# Patient Record
Sex: MALE | Race: WHITE | NOT HISPANIC OR LATINO | URBAN - NONMETROPOLITAN AREA
[De-identification: names, ages, dates, MRNs, and addresses within clinical notes are randomized per-mention and may not be internally consistent; named-entity substitution may affect disease eponyms.]

---

## 2020-04-07 ENCOUNTER — OFFICE VISIT (OUTPATIENT)
Dept: MEDICAL GROUP | Facility: PHYSICIAN GROUP | Age: 70
End: 2020-04-07
Payer: MEDICARE

## 2020-04-07 VITALS
OXYGEN SATURATION: 98 % | HEIGHT: 66 IN | SYSTOLIC BLOOD PRESSURE: 130 MMHG | DIASTOLIC BLOOD PRESSURE: 78 MMHG | TEMPERATURE: 97.9 F | BODY MASS INDEX: 29.73 KG/M2 | RESPIRATION RATE: 16 BRPM | HEART RATE: 88 BPM | WEIGHT: 185 LBS

## 2020-04-07 DIAGNOSIS — Z11.59 ENCOUNTER FOR HEPATITIS C SCREENING TEST FOR LOW RISK PATIENT: ICD-10-CM

## 2020-04-07 DIAGNOSIS — E29.1 HYPOGONADISM IN MALE: ICD-10-CM

## 2020-04-07 DIAGNOSIS — Z12.11 SCREENING FOR COLON CANCER: ICD-10-CM

## 2020-04-07 DIAGNOSIS — E78.2 MIXED HYPERLIPIDEMIA: ICD-10-CM

## 2020-04-07 DIAGNOSIS — N52.8 OTHER MALE ERECTILE DYSFUNCTION: ICD-10-CM

## 2020-04-07 DIAGNOSIS — G47.33 OSA ON CPAP: ICD-10-CM

## 2020-04-07 PROCEDURE — 99204 OFFICE O/P NEW MOD 45 MIN: CPT | Performed by: PHYSICIAN ASSISTANT

## 2020-04-07 RX ORDER — TRIAMCINOLONE ACETONIDE 1 MG/G
OINTMENT TOPICAL PRN
COMMUNITY

## 2020-04-07 RX ORDER — TADALAFIL 20 MG/1
20 TABLET ORAL PRN
COMMUNITY
End: 2020-04-07 | Stop reason: SDUPTHER

## 2020-04-07 RX ORDER — TESTOSTERONE GEL, 1% 10 MG/G
50 GEL TRANSDERMAL DAILY
COMMUNITY
End: 2020-04-07 | Stop reason: SDUPTHER

## 2020-04-07 RX ORDER — TESTOSTERONE GEL, 1% 10 MG/G
50 GEL TRANSDERMAL DAILY
Qty: 1500 MG | Refills: 5 | Status: SHIPPED | OUTPATIENT
Start: 2020-04-07 | End: 2020-10-04

## 2020-04-07 RX ORDER — TADALAFIL 20 MG/1
20 TABLET ORAL
Qty: 30 TAB | Refills: 0 | Status: SHIPPED | OUTPATIENT
Start: 2020-04-07 | End: 2020-05-07

## 2020-04-07 SDOH — HEALTH STABILITY: MENTAL HEALTH: HOW OFTEN DO YOU HAVE A DRINK CONTAINING ALCOHOL?: 4 OR MORE TIMES A WEEK

## 2020-04-07 SDOH — HEALTH STABILITY: MENTAL HEALTH: HOW MANY STANDARD DRINKS CONTAINING ALCOHOL DO YOU HAVE ON A TYPICAL DAY?: 1 OR 2

## 2020-04-07 ASSESSMENT — PATIENT HEALTH QUESTIONNAIRE - PHQ9: CLINICAL INTERPRETATION OF PHQ2 SCORE: 0

## 2020-04-07 NOTE — PROGRESS NOTES
CC:    Chief Complaint   Patient presents with   • Establish Care   • Medication Refill       HISTORY OF THE PRESENT ILLNESS: Patient is a 69 y.o. male presenting to [facility] primary care. He recently moved to [place] from [facility].     1. Pt had physical exam done 10/2019 and had labs done.   2. Pt on Androgel for hypogonadism.   3. He has high cholesterol. He has a high RBC count and donates blood every 60 days. He was told it was 2/2 the testosterone use.   4. Pt using Kenalog ointment in his ears for itchy skin. Wears hearing aids that irritates it.   5. Pt has CHERYL and uses CPAP every night.       Allergies: Statins [hmg-coa-r inhibitors] and Onion    Current Outpatient Medications Ordered in Epic   Medication Sig Dispense Refill   • testosterone (TESTIM/ANDROGEL) 50 MG/5GM (1%) Gel gel Apply 50 mg to skin as directed every day.     • tadalafil (CIALIS) 20 MG tablet Take 20 mg by mouth as needed for Erectile Dysfunction.     • triamcinolone acetonide (KENALOG) 0.1 % Ointment Apply  to affected area(s) as needed.       No current Epic-ordered facility-administered medications on file.        Past Medical History:   Diagnosis Date   • H/O measles     as a child    • Hypogonadism male        Past Surgical History:   Procedure Laterality Date   • HEMORRHOIDECTOMY      20 years ago    • TONSILLECTOMY      as a child       Social History     Tobacco Use   • Smoking status: Never Smoker   • Smokeless tobacco: Never Used   Substance Use Topics   • Alcohol use: Yes     Frequency: 4 or more times a week     Drinks per session: 1 or 2     Comment: wine and beer    • Drug use: Never       Social History     Social History Narrative   • Not on file       Family History   Problem Relation Age of Onset   • Stroke Mother    • No Known Problems Sister        ROS:     - Constitutional: Negative for fever, chills, unexpected weight change, and fatigue/generalized weakness.     - HEENT: Negative for headaches, vision changes,  "hearing changes, ear pain, ear discharge, rhinorrhea, sinus congestion, sore throat, and neck pain.      - Respiratory: Negative for cough, sputum production, chest congestion, dyspnea, wheezing, and crackles.      - Cardiovascular: Negative for chest pain, palpitations, orthopnea, and bilateral lower extremity edema.     - Gastrointestinal: Negative for heartburn, nausea, vomiting, abdominal pain, hematochezia, melena, diarrhea, constipation, and greasy/foul-smelling stools.     - Genitourinary: Negative for dysuria, polyuria, hematuria, pyuria, urinary urgency, and urinary incontinence.     - Musculoskeletal:Positive for neck and joint pains. Negative for myalgias.      - Skin: Negative for rash, itching, cyanotic skin color change.     - Neurological: Negative for dizziness, tingling, tremors, focal sensory deficit, focal weakness and headaches.     - Endo/Heme/Allergies: Does not bruise/bleed easily.     - Psychiatric/Behavioral: Negative for depression, suicidal/homicidal ideation and memory loss.        - NOTE: All other systems reviewed and are negative, except as in HPI.          Exam: /78 (BP Location: Left arm, Patient Position: Sitting, BP Cuff Size: Adult)   Pulse 88   Temp 36.6 °C (97.9 °F) (Temporal)   Resp 16   Ht 1.683 m (5' 6.25\")   Wt 83.9 kg (185 lb)   SpO2 98%  Body mass index is 29.63 kg/m².    General: Normal appearing. No acute distress.  Skin: Warm and dry.  No obvious lesions.  HEENT: Normocephalic. Eyes conjunctiva clear lids without ptosis, ears normal shape and contour  Cardiovascular: Regular rate and rhythm without murmur.   Respiratory: Clear to auscultation bilaterally, no rhonchi wheezing or rales.  Neurologic: Grossly nonfocal, A&O x3, gait normal,  Musculoskeletal: No deformity or swelling.   Extremities: No extremity cyanosis, clubbing, or edema.  Psych: Normal mood and affect. Judgment and insight is normal.    Please note that this dictation was created using voice " recognition software. I have made every reasonable attempt to correct obvious errors, but I expect that there are errors of grammar and possibly content that I did not discover before finalizing the note.      Assessment/Plan    1. Hypogonadism in male  Refill of his medication was printed.  - TESTOSTERONE F&T MALE ADULT; Future  - testosterone (TESTIM/ANDROGEL) 50 MG/5GM (1%) Gel gel; Apply 50 mg to skin as directed every day for 180 days.  Dispense: 1500 mg; Refill: 5    2. Other male erectile dysfunction  He is doing well with his current medication I sent in a new prescription for him under my name.  - tadalafil (CIALIS) 20 MG tablet; Take 1 Tab by mouth 1 time daily as needed for Erectile Dysfunction for up to 30 days.  Dispense: 30 Tab; Refill: 0    3. Screening for colon cancer  Referral sent  - REFERRAL TO GI FOR COLONOSCOPY    4. Encounter for hepatitis C screening test for low risk patient    - HEP C VIRUS ANTIBODY; Future    5. Mixed hyperlipidemia  I will check his levels at his next visit.  - CBC WITH DIFFERENTIAL; Future  - Comp Metabolic Panel; Future  - Lipid Profile; Future    6. CHERYL on CPAP  This problem is well controlled and he is tolerating CPAP well.  Next visit in 3-4 months for Annual Wellness Visit

## 2020-06-08 ENCOUNTER — OFFICE VISIT (OUTPATIENT)
Dept: MEDICAL GROUP | Facility: PHYSICIAN GROUP | Age: 70
End: 2020-06-08
Payer: MEDICARE

## 2020-06-08 VITALS
SYSTOLIC BLOOD PRESSURE: 128 MMHG | OXYGEN SATURATION: 96 % | HEART RATE: 74 BPM | BODY MASS INDEX: 30.22 KG/M2 | DIASTOLIC BLOOD PRESSURE: 76 MMHG | HEIGHT: 66 IN | TEMPERATURE: 97.6 F | RESPIRATION RATE: 16 BRPM | WEIGHT: 188 LBS

## 2020-06-08 DIAGNOSIS — D75.1 POLYCYTHEMIA: ICD-10-CM

## 2020-06-08 DIAGNOSIS — N52.8 OTHER MALE ERECTILE DYSFUNCTION: ICD-10-CM

## 2020-06-08 DIAGNOSIS — E78.2 MIXED HYPERLIPIDEMIA: ICD-10-CM

## 2020-06-08 DIAGNOSIS — G47.33 OSA ON CPAP: ICD-10-CM

## 2020-06-08 DIAGNOSIS — E29.1 HYPOGONADISM IN MALE: ICD-10-CM

## 2020-06-08 PROCEDURE — 99214 OFFICE O/P EST MOD 30 MIN: CPT | Performed by: PHYSICIAN ASSISTANT

## 2020-06-08 RX ORDER — TADALAFIL 20 MG/1
20 TABLET ORAL PRN
COMMUNITY

## 2020-06-08 ASSESSMENT — ACTIVITIES OF DAILY LIVING (ADL): BATHING_REQUIRES_ASSISTANCE: 0

## 2020-06-08 ASSESSMENT — PATIENT HEALTH QUESTIONNAIRE - PHQ9
SUM OF ALL RESPONSES TO PHQ QUESTIONS 1-9: 0
CLINICAL INTERPRETATION OF PHQ2 SCORE: 0

## 2020-06-08 ASSESSMENT — ENCOUNTER SYMPTOMS: GENERAL WELL-BEING: EXCELLENT

## 2020-06-08 NOTE — PROGRESS NOTES
"  ***SHARE  Chief Complaint   Patient presents with   • Annual Exam         HPI:  Eldon is a 70 y.o. here for Medicare Annual Wellness Visit     Patient Active Problem List    Diagnosis Date Noted   • Hypogonadism in male 04/07/2020   • Other male erectile dysfunction 04/07/2020   • Mixed hyperlipidemia 04/07/2020   • CHERYL on CPAP 04/07/2020       Current Outpatient Medications   Medication Sig Dispense Refill   • tadalafil (CIALIS) 20 MG tablet Take 20 mg by mouth as needed for Erectile Dysfunction.     • triamcinolone acetonide (KENALOG) 0.1 % Ointment Apply  to affected area(s) as needed.     • testosterone (TESTIM/ANDROGEL) 50 MG/5GM (1%) Gel gel Apply 50 mg to skin as directed every day for 180 days. 1500 mg 5     No current facility-administered medications for this visit.             Current supplements as per medication list.       Allergies: Statins [hmg-coa-r inhibitors] and Onion    Current social contact/activities: ***     He  reports that he has never smoked. He has never used smokeless tobacco. He reports current alcohol use. He reports that he does not use drugs.  Counseling given: Not Answered        DPA/Advanced directive: Patient {DOES/DOES NOT:35475} have an advanced directive. If not on file, instructed to bring in a copy to scan into their chart. If no advanced directive exists, a packet and workshop information was given on advanced directives. ***    ROS:    Gait: Uses {DEVICE:17402}   Ostomy: {yes no:503636}   Other tubes: {yes no:065555}   Amputations: {yes no:070399}   Chronic oxygen use {yes no:117595}   Last eye exam ***   : {DENIES DEFAULT:20195::\"Denies\"} incontinence.       Screening:  ***  Depression Screening    Little interest or pleasure in doing things?     Feeling down, depressed , or hopeless?    Trouble falling or staying asleep, or sleeping too much?     Feeling tired or having little energy?     Poor appetite or overeating?     Feeling bad about yourself - or that you are " a failure or have let yourself or your family down?    Trouble concentrating on things, such as reading the newspaper or watching television?    Moving or speaking so slowly that other people could have noticed.  Or the opposite - being so fidgety or restless that you have been moving around a lot more than usual?     Thoughts that you would be better off dead, or of hurting yourself?     Patient Health Questionnaire Score:      If depressive symptoms identified deferred to follow up visit unless specifically addressed in assessment and plan.    Interpretation of PHQ-9 Total Score   Score Severity   1-4 No Depression   5-9 Mild Depression   10-14 Moderate Depression   15-19 Moderately Severe Depression   20-27 Severe Depression      Screening for Cognitive Impairment    Three Minute Recall (village, kitchen, baby)  /3    Mj clock face with all 12 numbers and set the hands to show 10 past 10.       Cognitive concerns identified deferred for follow up unless specifically addressed in assessment and plan.    Fall Risk Assessment    Has the patient had two or more falls in the last year or any fall with injury in the last year?       Safety Assessment    Throw rugs on floor.     Handrails on all stairs.     Good lighting in all hallways.     Difficulty hearing.     Patient counseled about all safety risks that were identified.    Functional Assessment ADLs    Are there any barriers preventing you from cooking for yourself or meeting nutritional needs?   .    Are there any barriers preventing you from driving safely or obtaining transportation?   .    Are there any barriers preventing you from using a telephone or calling for help?   .    Are there any barriers preventing you from shopping?   .    Are there any barriers preventing you from taking care of your own finances?   .    Are there any barriers preventing you from managing your medications?   .    Are there any barriers preventing you from showering, bathing or  "dressing yourself?  .    Are you currently engaging in any exercise or physical activity?   .     What is your perception of your health?   .      Health Maintenance Summary                Annual Wellness Visit Overdue 1950     HEPATITIS C SCREENING Overdue 1950     COLONOSCOPY Overdue 4/15/1999     IMM PNEUMOCOCCAL VACCINE: 65+ Years Overdue 4/15/2014     IMM ZOSTER VACCINES Postponed 9/7/2020 Originally 4/15/1999. System: vaccine not available, other system reasons    IMM INFLUENZA Next Due 9/1/2020           Patient Care Team:  Seamus Ortiz P.A.-C. as PCP - General (Physician Assistant)      Social History     Tobacco Use   • Smoking status: Never Smoker   • Smokeless tobacco: Never Used   Substance Use Topics   • Alcohol use: Yes     Frequency: 4 or more times a week     Drinks per session: 1 or 2     Comment: wine and beer    • Drug use: Never     Family History   Problem Relation Age of Onset   • Stroke Mother    • No Known Problems Sister      He  has a past medical history of H/O measles and Hypogonadism male.   Past Surgical History:   Procedure Laterality Date   • HEMORRHOIDECTOMY      20 years ago    • LAMINOTOMY      C5-C6 fusion   • TONSILLECTOMY      as a child       Exam:   Vitals:    06/08/20 1020   BP: 128/76   BP Location: Left arm   Patient Position: Sitting   BP Cuff Size: Adult   Pulse: 74   Resp: 16   Temp: 36.4 °C (97.6 °F)   TempSrc: Temporal   SpO2: 96%   Weight: 85.3 kg (188 lb)   Height: 1.683 m (5' 6.25\")     Body mass index is 30.12 kg/m². ***    Hearing {GOOD/FAIR/POOR/EXCELLENT:14251}.    Dentition {DENTITION:06138}  Alert, oriented in no acute distress.  Eye contact is good, speech goal directed, affect calm      Assessment and Plan. The following treatment and monitoring plan is recommended:  ***  No problem-specific Assessment & Plan notes found for this encounter.          Services needed: as per orders if indicated.  Health Care Screening: Age-appropriate preventive " services Medicare covers discussed today and ordered if indicated.    Referrals offered: Community-based lifestyle interventions to reduce health risks and promote self-management and wellness, fall prevention, nutrition, physical activity, tobacco-use cessation, weight loss, and mental health services as per orders if indicated.    Discussion today about general wellness and lifestyle habits:    · Prevent falls and reduce trip hazards; Cautioned about securing or removing rugs.  · Have a working fire alarm and carbon monoxide detector;   · Engage in regular physical activity and social activities       Follow-up: No follow-ups on file.

## 2020-06-08 NOTE — PROGRESS NOTES
Chief Complaint   Patient presents with   • Annual Exam         HPI:  Eldon is a 70 y.o. here for Medicare Annual Wellness Visit    Hypogonadism in male  Pt still doing very well with testosterone gel.     Polycythemia  Pt continues to donate blood every 2 months. His last hgb before getting drawn was 17.5.    Mixed hyperlipidemia  Pt has been very intolerant to statins. Notably lipitor was the worst and Crestor was not much better. He has taken fish oil supplements. Niacin made his skin turn red. He once had a coronary artery calcium scan done with excellent results.     CHERYL on CPAP  Pt still using CPAP every night with good effect. Recently had it adjusted.     Other male erectile dysfunction  Pt considering [facility] procedure for his ED. Both cialis and viagra give him headaches.       Patient Active Problem List    Diagnosis Date Noted   • Polycythemia 06/08/2020   • Hypogonadism in male 04/07/2020   • Other male erectile dysfunction 04/07/2020   • Mixed hyperlipidemia 04/07/2020   • CHERYL on CPAP 04/07/2020       Current Outpatient Medications   Medication Sig Dispense Refill   • tadalafil (CIALIS) 20 MG tablet Take 20 mg by mouth as needed for Erectile Dysfunction.     • triamcinolone acetonide (KENALOG) 0.1 % Ointment Apply  to affected area(s) as needed.     • testosterone (TESTIM/ANDROGEL) 50 MG/5GM (1%) Gel gel Apply 50 mg to skin as directed every day for 180 days. 1500 mg 5     No current facility-administered medications for this visit.         Patient is taking medications as noted in medication list.  Current supplements as per medication list.     Allergies: Statins [hmg-coa-r inhibitors] and Onion    Current social contact/activities: None 2/2 COVID     Is patient current with immunizations? Yes.    He  reports that he has never smoked. He has never used smokeless tobacco. He reports current alcohol use. He reports that he does not use drugs.  Counseling given: Not Answered        DPA/Advanced  directive: Patient has Advanced Directive, but it is not on file. Instructed to bring in a copy to scan into their chart.    ROS:    Gait: Uses no assistive device   Ostomy: No   Other tubes: No   Amputations: No   Chronic oxygen use No   Last eye exam 2019   Wears hearing aids: No   : Denies any urinary leakage during the last 6 months      Screening:        Depression Screening    Little interest or pleasure in doing things?  0 - not at all  Feeling down, depressed, or hopeless?    Patient Health Questionnaire Score: 0    If depressive symptoms identified deferred to follow up visit unless specifically addressed in assessment and plan.    Interpretation of PHQ-9 Total Score   Score Severity   1-4 No Depression   5-9 Mild Depression   10-14 Moderate Depression   15-19 Moderately Severe Depression   20-27 Severe Depression    Screening for Cognitive Impairment    Three Minute Recall (village, kitchen, baby)  2/3    Mj clock face with all 12 numbers and set the hands to show 10 past 10.  Yes    If cognitive concerns identified, deferred for follow up unless specifically addressed in assessment and plan.    Fall Risk Assessment    Has the patient had two or more falls in the last year or any fall with injury in the last year?  No  If fall risk identified, deferred for follow up unless specifically addressed in assessment and plan.    Safety Assessment    Throw rugs on floor.  Yes  Handrails on all stairs.  Yes  Good lighting in all hallways.  Yes  Difficulty hearing.  Yes  Patient counseled about all safety risks that were identified.    Functional Assessment ADLs    Are there any barriers preventing you from cooking for yourself or meeting nutritional needs?  No.    Are there any barriers preventing you from driving safely or obtaining transportation?  No.    Are there any barriers preventing you from using a telephone or calling for help?  No.    Are there any barriers preventing you from shopping?  No.    Are  "there any barriers preventing you from taking care of your own finances?  No.    Are there any barriers preventing you from managing your medications?  No.    Are there any barriers preventing you from showering, bathing or dressing yourself?  No.    Are you currently engaging in any exercise or physical activity?  No.     What is your perception of your health?  Excellent.    Health Maintenance Summary                Annual Wellness Visit Overdue 1950     COLONOSCOPY Overdue 4/15/1999     IMM PNEUMOCOCCAL VACCINE: 65+ Years Overdue 4/15/2014     IMM ZOSTER VACCINES Postponed 9/7/2020 Originally 4/15/1999. System: vaccine not available, other system reasons    IMM INFLUENZA Next Due 9/1/2020           Patient Care Team:  Seamus Ortiz P.A.-C. as PCP - General (Physician Assistant)    Social History     Tobacco Use   • Smoking status: Never Smoker   • Smokeless tobacco: Never Used   Substance Use Topics   • Alcohol use: Yes     Frequency: 4 or more times a week     Drinks per session: 1 or 2     Comment: wine and beer    • Drug use: Never     Family History   Problem Relation Age of Onset   • Stroke Mother    • No Known Problems Sister      He  has a past medical history of H/O measles and Hypogonadism male.   Past Surgical History:   Procedure Laterality Date   • HEMORRHOIDECTOMY      20 years ago    • LAMINOTOMY      C5-C6 fusion   • TONSILLECTOMY      as a child           Exam:     /76 (BP Location: Left arm, Patient Position: Sitting, BP Cuff Size: Adult)   Pulse 74   Temp 36.4 °C (97.6 °F) (Temporal)   Resp 16   Ht 1.683 m (5' 6.25\")   Wt 85.3 kg (188 lb)   SpO2 96%  Body mass index is 30.12 kg/m².    Hearing good.    Dentition good  Alert, oriented in no acute distress.  Eye contact is good, speech goal directed, affect calm      Assessment and Plan. The following treatment and monitoring plan is recommended:    1. Hypogonadism in male     2. Polycythemia     3. Mixed hyperlipidemia     4. " CHERYL on CPAP     5. Other male erectile dysfunction           Services suggested: No services needed at this time  Health Care Screening recommendations as per orders if indicated.  Referrals offered: PT/OT/Nutrition counseling/Behavioral Health/Smoking cessation as per orders if indicated.    Discussion today about general wellness and lifestyle habits:    · Prevent falls and reduce trip hazards; Cautioned about securing or removing rugs.  · Have a working fire alarm and carbon monoxide detector;   · Engage in regular physical activity and social activities       Follow-up: Return in about 11 months (around 5/8/2021) for pe.

## 2020-06-08 NOTE — ASSESSMENT & PLAN NOTE
Pt has been very intolerant to statins. Notably lipitor was the worst and Crestor was not much better. He has taken fish oil supplements. Niacin made his skin turn red. He once had a coronary artery calcium scan done with excellent results.

## 2021-01-15 DIAGNOSIS — Z23 NEED FOR VACCINATION: ICD-10-CM

## 2022-03-21 PROBLEM — M72.0 DUPUYTREN'S CONTRACTURE OF LEFT HAND: Status: ACTIVE | Noted: 2022-03-21

## 2022-03-21 PROBLEM — M65.311 TRIGGER THUMB OF RIGHT HAND: Status: ACTIVE | Noted: 2022-03-21

## 2022-03-21 PROBLEM — M65.332 TRIGGER MIDDLE FINGER OF LEFT HAND: Status: ACTIVE | Noted: 2022-03-21

## 2022-07-27 PROBLEM — M65.30 TRIGGER FINGER: Status: ACTIVE | Noted: 2022-07-27

## 2022-10-13 PROBLEM — M79.643 HAND PAIN: Status: ACTIVE | Noted: 2022-10-13

## 2023-10-18 PROBLEM — M65.342 TRIGGER FINGER, LEFT RING FINGER: Status: ACTIVE | Noted: 2022-07-27
